# Patient Record
Sex: FEMALE | Race: WHITE | NOT HISPANIC OR LATINO | Employment: FULL TIME | URBAN - METROPOLITAN AREA
[De-identification: names, ages, dates, MRNs, and addresses within clinical notes are randomized per-mention and may not be internally consistent; named-entity substitution may affect disease eponyms.]

---

## 2018-05-14 ENCOUNTER — APPOINTMENT (OUTPATIENT)
Dept: RADIOLOGY | Facility: MEDICAL CENTER | Age: 38
End: 2018-05-14
Attending: EMERGENCY MEDICINE
Payer: COMMERCIAL

## 2018-05-14 ENCOUNTER — HOSPITAL ENCOUNTER (EMERGENCY)
Facility: MEDICAL CENTER | Age: 38
End: 2018-05-14
Attending: EMERGENCY MEDICINE
Payer: COMMERCIAL

## 2018-05-14 VITALS
SYSTOLIC BLOOD PRESSURE: 162 MMHG | DIASTOLIC BLOOD PRESSURE: 100 MMHG | HEIGHT: 64 IN | BODY MASS INDEX: 39.07 KG/M2 | OXYGEN SATURATION: 96 % | HEART RATE: 66 BPM | TEMPERATURE: 97.8 F | RESPIRATION RATE: 16 BRPM | WEIGHT: 228.84 LBS

## 2018-05-14 DIAGNOSIS — N10 PYELONEPHRITIS, ACUTE: ICD-10-CM

## 2018-05-14 LAB
ALBUMIN SERPL BCP-MCNC: 3.7 G/DL (ref 3.2–4.9)
ALBUMIN/GLOB SERPL: 1.2 G/DL
ALP SERPL-CCNC: 92 U/L (ref 30–99)
ALT SERPL-CCNC: 89 U/L (ref 2–50)
ANION GAP SERPL CALC-SCNC: 8 MMOL/L (ref 0–11.9)
APPEARANCE UR: ABNORMAL
AST SERPL-CCNC: 37 U/L (ref 12–45)
BACTERIA #/AREA URNS HPF: ABNORMAL /HPF
BASOPHILS # BLD AUTO: 0.9 % (ref 0–1.8)
BASOPHILS # BLD: 0.15 K/UL (ref 0–0.12)
BILIRUB SERPL-MCNC: 0.4 MG/DL (ref 0.1–1.5)
BILIRUB UR QL STRIP.AUTO: NEGATIVE
BUN SERPL-MCNC: 11 MG/DL (ref 8–22)
CALCIUM SERPL-MCNC: 9.1 MG/DL (ref 8.5–10.5)
CHLORIDE SERPL-SCNC: 102 MMOL/L (ref 96–112)
CO2 SERPL-SCNC: 27 MMOL/L (ref 20–33)
COLOR UR: YELLOW
CREAT SERPL-MCNC: 0.67 MG/DL (ref 0.5–1.4)
EOSINOPHIL # BLD AUTO: 0.44 K/UL (ref 0–0.51)
EOSINOPHIL NFR BLD: 2.6 % (ref 0–6.9)
EPI CELLS #/AREA URNS HPF: NEGATIVE /HPF
ERYTHROCYTE [DISTWIDTH] IN BLOOD BY AUTOMATED COUNT: 49.1 FL (ref 35.9–50)
GLOBULIN SER CALC-MCNC: 3.1 G/DL (ref 1.9–3.5)
GLUCOSE SERPL-MCNC: 109 MG/DL (ref 65–99)
GLUCOSE UR STRIP.AUTO-MCNC: NEGATIVE MG/DL
HCG SERPL QL: NEGATIVE
HCT VFR BLD AUTO: 42.3 % (ref 37–47)
HGB BLD-MCNC: 13.8 G/DL (ref 12–16)
HYALINE CASTS #/AREA URNS LPF: ABNORMAL /LPF
KETONES UR STRIP.AUTO-MCNC: NEGATIVE MG/DL
LEUKOCYTE ESTERASE UR QL STRIP.AUTO: ABNORMAL
LIPASE SERPL-CCNC: 18 U/L (ref 11–82)
LYMPHOCYTES # BLD AUTO: 6.24 K/UL (ref 1–4.8)
LYMPHOCYTES NFR BLD: 36.5 % (ref 22–41)
MANUAL DIFF BLD: NORMAL
MCH RBC QN AUTO: 29.9 PG (ref 27–33)
MCHC RBC AUTO-ENTMCNC: 32.6 G/DL (ref 33.6–35)
MCV RBC AUTO: 91.8 FL (ref 81.4–97.8)
MICRO URNS: ABNORMAL
MONOCYTES # BLD AUTO: 0.89 K/UL (ref 0–0.85)
MONOCYTES NFR BLD AUTO: 5.2 % (ref 0–13.4)
MORPHOLOGY BLD-IMP: NORMAL
NEUTROPHILS # BLD AUTO: 9.37 K/UL (ref 2–7.15)
NEUTROPHILS NFR BLD: 54.8 % (ref 44–72)
NITRITE UR QL STRIP.AUTO: NEGATIVE
NRBC # BLD AUTO: 0 K/UL
NRBC BLD-RTO: 0 /100 WBC
PH UR STRIP.AUTO: 8 [PH]
PLATELET # BLD AUTO: 333 K/UL (ref 164–446)
PLATELET BLD QL SMEAR: NORMAL
PMV BLD AUTO: 12 FL (ref 9–12.9)
POTASSIUM SERPL-SCNC: 3.5 MMOL/L (ref 3.6–5.5)
PROT SERPL-MCNC: 6.8 G/DL (ref 6–8.2)
PROT UR QL STRIP: 30 MG/DL
RBC # BLD AUTO: 4.61 M/UL (ref 4.2–5.4)
RBC # URNS HPF: ABNORMAL /HPF
RBC BLD AUTO: NORMAL
RBC UR QL AUTO: ABNORMAL
SODIUM SERPL-SCNC: 137 MMOL/L (ref 135–145)
SP GR UR STRIP.AUTO: 1.01
UROBILINOGEN UR STRIP.AUTO-MCNC: 0.2 MG/DL
WBC # BLD AUTO: 17.1 K/UL (ref 4.8–10.8)
WBC #/AREA URNS HPF: ABNORMAL /HPF

## 2018-05-14 PROCEDURE — 80053 COMPREHEN METABOLIC PANEL: CPT

## 2018-05-14 PROCEDURE — 81001 URINALYSIS AUTO W/SCOPE: CPT

## 2018-05-14 PROCEDURE — 83690 ASSAY OF LIPASE: CPT

## 2018-05-14 PROCEDURE — 700111 HCHG RX REV CODE 636 W/ 250 OVERRIDE (IP): Performed by: EMERGENCY MEDICINE

## 2018-05-14 PROCEDURE — 96375 TX/PRO/DX INJ NEW DRUG ADDON: CPT

## 2018-05-14 PROCEDURE — 85027 COMPLETE CBC AUTOMATED: CPT

## 2018-05-14 PROCEDURE — 87077 CULTURE AEROBIC IDENTIFY: CPT

## 2018-05-14 PROCEDURE — 74176 CT ABD & PELVIS W/O CONTRAST: CPT

## 2018-05-14 PROCEDURE — 84703 CHORIONIC GONADOTROPIN ASSAY: CPT

## 2018-05-14 PROCEDURE — 85007 BL SMEAR W/DIFF WBC COUNT: CPT

## 2018-05-14 PROCEDURE — 96374 THER/PROPH/DIAG INJ IV PUSH: CPT

## 2018-05-14 PROCEDURE — 99284 EMERGENCY DEPT VISIT MOD MDM: CPT

## 2018-05-14 PROCEDURE — 87186 SC STD MICRODIL/AGAR DIL: CPT

## 2018-05-14 PROCEDURE — 87086 URINE CULTURE/COLONY COUNT: CPT

## 2018-05-14 RX ORDER — FLUCONAZOLE 150 MG/1
150 TABLET ORAL DAILY
Qty: 2 TAB | Refills: 0 | Status: SHIPPED | OUTPATIENT
Start: 2018-05-14 | End: 2022-06-07

## 2018-05-14 RX ORDER — CEFTRIAXONE 1 G/1
1 INJECTION, POWDER, FOR SOLUTION INTRAMUSCULAR; INTRAVENOUS ONCE
Status: COMPLETED | OUTPATIENT
Start: 2018-05-14 | End: 2018-05-14

## 2018-05-14 RX ORDER — ONDANSETRON 8 MG/1
8 TABLET, ORALLY DISINTEGRATING ORAL EVERY 8 HOURS PRN
Qty: 15 TAB | Refills: 0 | Status: SHIPPED | OUTPATIENT
Start: 2018-05-14 | End: 2022-06-07

## 2018-05-14 RX ORDER — MORPHINE SULFATE 10 MG/ML
10 INJECTION, SOLUTION INTRAMUSCULAR; INTRAVENOUS ONCE
Status: COMPLETED | OUTPATIENT
Start: 2018-05-14 | End: 2018-05-14

## 2018-05-14 RX ORDER — ONDANSETRON 2 MG/ML
4 INJECTION INTRAMUSCULAR; INTRAVENOUS ONCE
Status: COMPLETED | OUTPATIENT
Start: 2018-05-14 | End: 2018-05-14

## 2018-05-14 RX ORDER — CEPHALEXIN 500 MG/1
500 CAPSULE ORAL 2 TIMES DAILY
Qty: 12 CAP | Refills: 0 | Status: SHIPPED | OUTPATIENT
Start: 2018-05-14 | End: 2018-05-20

## 2018-05-14 RX ORDER — NAPROXEN 500 MG/1
500 TABLET ORAL 2 TIMES DAILY WITH MEALS
Qty: 20 TAB | Refills: 0 | Status: SHIPPED | OUTPATIENT
Start: 2018-05-14 | End: 2022-06-07

## 2018-05-14 RX ADMIN — ONDANSETRON 4 MG: 2 INJECTION INTRAMUSCULAR; INTRAVENOUS at 03:49

## 2018-05-14 RX ADMIN — CEFTRIAXONE SODIUM 1 G: 1 INJECTION, POWDER, FOR SOLUTION INTRAMUSCULAR; INTRAVENOUS at 05:38

## 2018-05-14 RX ADMIN — MORPHINE SULFATE 10 MG: 10 INJECTION INTRAVENOUS at 03:49

## 2018-05-14 ASSESSMENT — PAIN SCALES - GENERAL: PAINLEVEL_OUTOF10: 8

## 2018-05-14 NOTE — ED NOTES
Patient given discharge teaching and education. Verbalizes understanding. Given chance to ask questions, all questions answered. Educated patient of signs and symptoms to returned to ER, and educated patient they can return for any concerning symptoms. Four prescriptions provided to patient. Education given to patient about medications. Patient states they have their belongings. Denies additional needs at this time. Reports pain is well controlled. Patient monitored every hour and PRN for safety and comfort. Patient ambulatory out of department.

## 2018-05-14 NOTE — ED PROVIDER NOTES
"ED Provider Note    ED Provider Note    Primary care provider: No primary care provider on file.  Means of arrival: Walk-in  History obtained from: Patient    CHIEF COMPLAINT  Chief Complaint   Patient presents with   • Flank Pain     R side. Hx of kidney stones but have always been on the left, today began on R side. Comes in waves, pt feels this is likely a kidney stone as well.    • Fever     99-100F at home.    • Nausea     Seen at 3:23 AM.   HPI  Brittney Mg is a 37 y.o. female with history of recurrent left-sided nephrolithiasis who presents to the Emergency Department with severe right flank pain radiating to the pelvis. She reports having left-sided flank pain approximately 5 days ago, this resolved, she fell that she passed a stone at that time. Since then she has had intermittent fevers for the past 3-4 days. She has not had a fever today. However today the right-sided abdominal and flank pain began.    She describes the pain as stabbing and radiating, worse with standing and moving and slightly alleviated with a recumbent position. She normally does not have pain in the right as all of the stones in the past and been in the left side. She has had lithotripsy in the past as well as stents, this has all been performed in [place]. She now a resident of the [facility] and has been residing here for the past 10 months.    She endorses fevers, none in the past 24 hours. She also notes nausea, vomiting, abdominal pain. She denies dysuria, hematuria, vaginal discharge, numbness, weakness, chest pain, shortness of breath, diarrhea.        REVIEW OF SYSTEMS  See HPI,   Remainder of ROS negative.     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    CURRENT MEDICATIONS  Reviewed.  See Encounter Summary.     ALLERGIES  Allergies   Allergen Reactions   • Iodine Swelling       PHYSICAL EXAM  VITAL SIGNS: BP (!) 162/100   Pulse 66   Temp 36.6 °C (97.8 °F)   Resp 16   Ht 1.626 m (5' 4\")   Wt 103.8 kg (228 lb 13.4 oz)   SpO2 96%   BMI 39.28 kg/m²   Constitutional: Awake, alert in no apparent distress. Appears uncomfortable, obese  HENT: Normocephalic, Bilateral external ears normal. Nose normal.   Eyes: Conjunctiva normal, non-icteric, EOMI.    Thorax & Lungs: Easy unlabored respirations, Clear to ascultation bilaterally.  Cardiovascular: Regular rate, Regular rhythm, No murmurs, rubs or gallops.  Abdomen:  Soft, moderate epigastric, right upper quadrant and right flank tenderness without rebound or guarding, nondistended, normal active bowel sounds.   :    Skin: Visualized skin is  Dry, No erythema, No rash.   Musculoskeletal:   No cyanosis, clubbing or edema.  Neurologic: Alert, Grossly non-focal.   Psychiatric: Normal affect, Normal mood  Lymphatic:  No cervical LAD        RADIOLOGY  CT-RENAL COLIC EVALUATION(A/P W/O)   Final Result         1. No definite calculus seen in the ureter. Bilateral pelvic calcifications, likely phleboliths. No hydronephrosis.      2. Multiple nonobstructive left intrarenal calculi. Duplicated left renal collecting system.      3. No evidence of inflammatory change in the abdomen or pelvis.  The study is however limited due to nonuse of intravenous contrast        The radiologist's interpretation of all radiological studies have been reviewed by me.    COURSE & MEDICAL DECISION MAKING  Pertinent Labs & Imaging studies reviewed. (See chart for details)    Differential diagnoses include but are not limited to: Cholecystitis, cholelithiasis, biliary colic, renal colic, pyelonephritis, pancreatitis, GERD, abdominal pain NOS    3:23 AM - Medical record " reviewed, patient seen and examined at bedside.    6:22 AM - for patient with negative results and urinalysis with leukocytosis. I reviewed images of the abdominal pelvis CT with the patient.    Decision Making:  This is a 37 y.o. year old female who presents with history of fevers and right-sided flank pain. Differentials listed above. The CT the abdomen and pelvis shows an unremarkable gallbladder, LFTs are normal as well, this time does not appear to be acute cholecystitis or acute cholangitis. The patient does have multiple intrarenal calcifications in the left renal pelvis, none on the right. There are also multiple calcifications in the pelvis. The ureters bilaterally are normal. There are no intraureteral stones. The patient does have a significant leukocytosis, she states that she frequently has an elevated white count that has not been explained in the past.  In any event, she does have a few bacteria and 20-50 white blood cells in the urine. Because of the pyuria, bacteriuria and leukocytosis I will treat the patient for pallor nephritis. She received 1 g Rocephin in the emergency department and will be treated with Keflex. She was instructed to return for any intractable pain, persistent vomiting or any other concern.    She was also given a prescription for Diflucan to treat any yeast infection that develops from the antibiotics.    The patient's blood pressure is elevated today. >120/80. I have referred them to primary care for follow up.       Discharge Medications:  New Prescriptions    CEPHALEXIN (KEFLEX) 500 MG CAP    Take 1 Cap by mouth 2 times a day for 6 days.    FLUCONAZOLE (DIFLUCAN) 150 MG TABLET    Take 1 Tab by mouth every day.    NAPROXEN (NAPROSYN) 500 MG TAB    Take 1 Tab by mouth 2 times a day, with meals.    ONDANSETRON (ZOFRAN ODT) 8 MG TABLET DISPERSIBLE    Take 1 Tab by mouth every 8 hours as needed for Nausea.       The patient will be discharged home in good condition.    FINAL  IMPRESSION  1. Pyelonephritis, acute

## 2018-05-14 NOTE — ED TRIAGE NOTES
"Chief Complaint   Patient presents with   • Flank Pain     R side. Hx of kidney stones but have always been on the left, today began on R side. Comes in waves, pt feels this is likely a kidney stone as well.    • Fever     99-100F at home.    • Nausea     BP (!) 162/100   Pulse 87   Temp 36.6 °C (97.8 °F)   Resp 16   Ht 1.626 m (5' 4\")   Wt 103.8 kg (228 lb 13.4 oz)   SpO2 100%   BMI 39.28 kg/m²     Pt ambulatory to triage for above. Reports frequency in urination, \"I'm going all the time\" and feeling of fullness. Pt returned to [facility], educated on triage process, instructed to notify staff of worsening symptoms/concerns. Given urine specimen cup.   "

## 2018-05-16 LAB
BACTERIA UR CULT: ABNORMAL
BACTERIA UR CULT: ABNORMAL
SIGNIFICANT IND 70042: ABNORMAL
SITE SITE: ABNORMAL
SOURCE SOURCE: ABNORMAL

## 2018-05-16 NOTE — ED NOTES
ED Positive Culture Follow-up/Notification Note:    Date: 5/16/18     Patient seen in the ED on 5/14/2018 for right sided flank pain.   1. Pyelonephritis, acute       Discharge Medication List as of 5/14/2018  6:16 AM      START taking these medications    Details   naproxen (NAPROSYN) 500 MG Tab Take 1 Tab by mouth 2 times a day, with meals., Disp-20 Tab, R-0, Print Rx Paper      cephALEXin (KEFLEX) 500 MG Cap Take 1 Cap by mouth 2 times a day for 6 days., Disp-12 Cap, R-0, Print Rx Paper      ondansetron (ZOFRAN ODT) 8 MG TABLET DISPERSIBLE Take 1 Tab by mouth every 8 hours as needed for Nausea., Disp-15 Tab, R-0, Print Rx Paper             Allergies: Iodine     Vitals:    05/14/18 0400 05/14/18 0430 05/14/18 0500 05/14/18 0600   BP:       Pulse: 75 67 72 66   Resp:       Temp:       SpO2: 91% 96% 96% 96%   Weight:       Height:           Final cultures:   Results     Procedure Component Value Units Date/Time    URINE CULTURE(NEW) [[id number]]  (Abnormal)  (Susceptibility) Collected:  05/14/18 0313    Order Status:  Completed Specimen:  Urine Updated:  05/16/18 1109     Significant Indicator POS (POS)     Source UR     Site --     Urine Culture -- (A)      Escherichia coli  ,000 cfu/mL   (A)    Culture & Susceptibility     ESCHERICHIA COLI     Antibiotic Sensitivity Microscan Unit Status    Ampicillin Sensitive <=8 mcg/mL Final    Method: SENSITIVITY, ELISHA    Cefepime Sensitive <=8 mcg/mL Final    Method: SENSITIVITY, ELISHA    Cefotaxime Sensitive <=2 mcg/mL Final    Method: SENSITIVITY, ELISHA    Cefotetan Sensitive <=16 mcg/mL Final    Method: SENSITIVITY, ELISHA    Ceftazidime Sensitive <=1 mcg/mL Final    Method: SENSITIVITY, ELISHA    Ceftriaxone Sensitive <=8 mcg/mL Final    Method: SENSITIVITY, ELISHA    Cefuroxime Sensitive <=4 mcg/mL Final    Method: SENSITIVITY, ELISHA    Cephalothin Sensitive <=8 mcg/mL Final    Method: SENSITIVITY, ELISHA    Ciprofloxacin Sensitive <=1 mcg/mL Final    Method: SENSITIVITY, ELISHA     Gentamicin Sensitive <=4 mcg/mL Final    Method: SENSITIVITY, ELISHA    Levofloxacin Sensitive <=2 mcg/mL Final    Method: SENSITIVITY, ELISHA    Nitrofurantoin Sensitive <=32 mcg/mL Final    Method: SENSITIVITY, ELISHA    Pip/Tazobactam Sensitive <=16 mcg/mL Final    Method: SENSITIVITY, ELISHA    Piperacillin Sensitive <=16 mcg/mL Final    Method: SENSITIVITY, ELISHA    Tigecycline Sensitive <=2 mcg/mL Final    Method: SENSITIVITY, ELISHA    Tobramycin Sensitive <=4 mcg/mL Final    Method: SENSITIVITY, ELISHA    Trimeth/Sulfa Sensitive <=2/38 mcg/mL Final    Method: SENSITIVITY, ELISHA                       URINALYSIS CULTURE, IF INDICATED [[id number]]  (Abnormal) Collected:  05/14/18 0313    Order Status:  Completed Specimen:  Urine Updated:  05/14/18 0334     Color Yellow     Character Cloudy (A)     Specific Gravity 1.012     Ph 8.0     Glucose Negative mg/dL      Ketones Negative mg/dL      Protein 30 (A) mg/dL      Bilirubin Negative     Urobilinogen, Urine 0.2     Nitrite Negative     Leukocyte Esterase Moderate (A)     Occult Blood Small (A)     Micro Urine Req Microscopic          Plan:   Patient received ceftriaxone 1g IV in the ED.  Appropriate antibiotic therapy prescribed. No changes required based upon culture result.  Discussed results with patient - she states she is feeling much improved since presentation.  Recommended to continue antibiotics until course is completed, patient verbalized understanding.    Becky Byrnes

## 2018-07-06 ENCOUNTER — GYNECOLOGY VISIT (OUTPATIENT)
Dept: OBGYN | Facility: CLINIC | Age: 38
End: 2018-07-06
Payer: COMMERCIAL

## 2018-07-06 VITALS
HEIGHT: 64 IN | SYSTOLIC BLOOD PRESSURE: 160 MMHG | BODY MASS INDEX: 39.44 KG/M2 | WEIGHT: 231 LBS | DIASTOLIC BLOOD PRESSURE: 98 MMHG

## 2018-07-06 DIAGNOSIS — Z11.51 SCREENING FOR HUMAN PAPILLOMAVIRUS (HPV): ICD-10-CM

## 2018-07-06 DIAGNOSIS — Z01.419 WELL WOMAN EXAM WITH ROUTINE GYNECOLOGICAL EXAM: ICD-10-CM

## 2018-07-06 DIAGNOSIS — N92.6 IRREGULAR MENSTRUATION: ICD-10-CM

## 2018-07-06 DIAGNOSIS — N90.89 VULVAR LESION: ICD-10-CM

## 2018-07-06 DIAGNOSIS — I10 ESSENTIAL HYPERTENSION: ICD-10-CM

## 2018-07-06 DIAGNOSIS — Z12.4 PAP SMEAR FOR CERVICAL CANCER SCREENING: ICD-10-CM

## 2018-07-06 PROBLEM — N20.0 KIDNEY STONES: Status: ACTIVE | Noted: 2018-07-06

## 2018-07-06 PROCEDURE — 99385 PREV VISIT NEW AGE 18-39: CPT | Performed by: OBSTETRICS & GYNECOLOGY

## 2018-07-06 NOTE — PROGRESS NOTES
Brittney Mg is a 38 y.o.  female who presents for her Annual Gynecologic Exam      HPI Comments: Pt presents for her annual well woman exam.   Pt has complaint of vaginal dryness before period, decreased sex drive, irregular periods.    Has not taken BP meds in a couple days  Patient's last menstrual period was 2018.    Review of Systems:   Pertinent positives documented in HPI and all other systems reviewed & are negative    PGYN Hx: remote hx abnl pap, last pap normal, no hx STDs  Menses irregular, occur every 2-5 weeks, last 2-4 days, dark blood with quarter sized clots, states flow is medium, never soaks a pad or tampon  Hx breast lumps, normal mammograms    All PMH, PSH, allergies, social history and FH reviewed and updated today:  Past Medical History:   Diagnosis Date   • Hypertension    • Kidney disease    • Migraine    • Thyroid disease        Past Surgical History:   Procedure Laterality Date   • LITHOTRIPSY     • SPLENECTOMY     • THYROID LOBECTOMY         Medications:   Current Outpatient Prescriptions Ordered in [facility]   Medication Sig Dispense Refill   • naproxen (NAPROSYN) 500 MG Tab Take 1 Tab by mouth 2 times a day, with meals. (Patient not taking: Reported on 2018) 20 Tab 0   • ondansetron (ZOFRAN ODT) 8 MG TABLET DISPERSIBLE Take 1 Tab by mouth every 8 hours as needed for Nausea. (Patient not taking: Reported on 2018) 15 Tab 0   • fluconazole (DIFLUCAN) 150 MG tablet Take 1 Tab by mouth every day. (Patient not taking: Reported on 2018) 2 Tab 0     No current Epic-ordered facility-administered medications on file.        Iodine    Social History     Social History   • Marital status:      Spouse name: N/A   • Number of children: N/A   • Years of education: N/A     Social History Main Topics   • Smoking status: Current Every Day Smoker   • Smokeless tobacco: Never Used   • Alcohol use No   • Drug use: No   • Sexual activity: Yes     Partners: Male     Birth control/  "protection: Surgical     Other Topics Concern   • Not on file     Social History Narrative   • No narrative on file       Family History   Problem Relation Age of Onset   • Heart Disease Mother    • Heart Disease Father    • Hypertension Father           Objective:   Vital measurements:  Blood pressure 160/98, height 1.626 m (5' 4\"), weight 104.8 kg (231 lb), last menstrual period 05/25/2018, not currently breastfeeding.  Body mass index is 39.65 kg/m². (Goal BM I>18 <25)    Physical Exam   Nursing note and vitals reviewed.  Constitutional: She is oriented to person, place, and time. She appears well-developed and well-nourished. No distress.     HEENT:   Head: Normocephalic and atraumatic.   Right Ear: External ear normal.   Left Ear: External ear normal.   Nose: Nose normal.   Eyes: Conjunctivae and EOM are normal. Pupils are equal, round, and reactive to light. No scleral icterus.     Neck: Normal range of motion. Neck supple. No tracheal deviation present. No thyromegaly present. No cervical or supraclavicular lymphadenopathy.    Pulmonary/Chest: Effort normal and breath sounds normal. No respiratory distress. She has no wheezes. She has no rales. She exhibits no tenderness.     Cardiovascular: Regular, rate and rhythm. No edema.    Breast: Symmetrical, normal consistency without masses., No dimpling or skin changes, Normal nipples without discharge, no axillary lymphadenopathy    Abdominal: Soft. Bowel sounds are normal. She exhibits no distension and no mass. No tenderness. She has no rebound and no guarding.     Genitourinary: Obesity limits patient's accuracy  Pelvic exam was performed with patient supine.  External genitalia with no abnormal pigmentation, labial fusion, rashes, tenderness, or injury to the labia bilaterally. 4mm white velvety raised lesion on right labia majora.  BUS normal  Vagina is pink and moist with no lesions, foul discharge, erythema, tenderness or bleeding. No foreign body around " the vagina or signs of injury.   Cervix exhibits no motion tenderness, no discharge and no friability, no lesions.   Uterus is not deviated, not enlarged, not fixed and not tender.  Right adnexa displays no mass, no tenderness and no fullness.  Left adnexa displays no mass, no tenderness and no fullness.     Musculoskeletal: Normal range of motion. Non tender. She exhibits no edema and no tenderness.     Lymphadenopathy: She has no cervical or supraclavicular adenopathy.     Neurological: She is alert and oriented to person, place, and time. She exhibits normal muscle tone.     Skin: Skin is warm and dry. No rash noted. She is not diaphoretic. No erythema. No pallor.     Psychiatric: She has a normal mood and affect. Her behavior is normal. Judgment and thought content normal.        Assessment:     1. Well woman exam with routine gynecological exam     2. Screening for human papillomavirus (HPV)     3. Pap smear for cervical cancer screening     4. Irregular menstruation  US-GYN-PELVIS TRANSVAGINAL   5. Vulvar lesion  REFERRAL TO GYNECOLOGY   6. Essential hypertension           Plan:   Pap and physical exam performed  Self breast awareness discussed.  Encouraged exercise and proper diet.  Mammograms starting @ age 40 annually.  See medications and orders placed in encounter report.  Ordered pelvic u/s to evaluate abnl bleeding  Referral placed for excision of vulvar lesion  Follow up with PCP for HTN  Follow up in 1 year for annual exam or sooner as needed

## 2018-07-07 ENCOUNTER — HOSPITAL ENCOUNTER (OUTPATIENT)
Facility: MEDICAL CENTER | Age: 38
End: 2018-07-07
Attending: OBSTETRICS & GYNECOLOGY
Payer: COMMERCIAL

## 2018-07-12 LAB
C TRACH DNA SPEC QL NAA+PROBE: NOT DETECTED
N GONORRHOEA DNA SPEC QL NAA+PROBE: NOT DETECTED
PHYSICIAN READ PAP  881411: NORMAL

## 2018-07-17 ENCOUNTER — HOSPITAL ENCOUNTER (OUTPATIENT)
Dept: RADIOLOGY | Facility: MEDICAL CENTER | Age: 38
End: 2018-07-17
Attending: OBSTETRICS & GYNECOLOGY
Payer: COMMERCIAL

## 2018-07-17 DIAGNOSIS — N92.6 IRREGULAR MENSTRUATION: ICD-10-CM

## 2018-07-17 PROCEDURE — 76830 TRANSVAGINAL US NON-OB: CPT

## 2018-07-20 ENCOUNTER — TELEPHONE (OUTPATIENT)
Dept: OBGYN | Facility: CLINIC | Age: 38
End: 2018-07-20

## 2018-07-20 NOTE — TELEPHONE ENCOUNTER
----- Message from Korin Dow M.D. sent at 7/19/2018  4:07 PM PDT -----  Please inform patient her ultrasound looked good. Her uterus was mildly enlarged (10cm and 9cm or less is normal). This is not a problem unless she has heavy periods. She has 2cm fibroid on the back of her uterus also. These are benign tumors of the uterus and do not need to be removed or treated unless she has heavy periods. Her ovaries looked normal. Overall the ultrasound findings were pretty normal.

## 2018-07-20 NOTE — TELEPHONE ENCOUNTER
Pt advised of ultrasound results and she again states that her menses is only heavy on 1st day.Pt is also scheduled for vulvar BX and she will discuss any further questions with Dr at time of visit. She states she and  would like to have her BTL reversed and she is advised that our doctors do not preform this procedure but she is given names of  and .[facility], [place]

## 2022-06-07 ENCOUNTER — APPOINTMENT (OUTPATIENT)
Dept: RADIOLOGY | Facility: IMAGING CENTER | Age: 42
End: 2022-06-07
Attending: NURSE PRACTITIONER
Payer: COMMERCIAL

## 2022-06-07 ENCOUNTER — OFFICE VISIT (OUTPATIENT)
Dept: URGENT CARE | Facility: CLINIC | Age: 42
End: 2022-06-07
Payer: COMMERCIAL

## 2022-06-07 VITALS
HEIGHT: 64 IN | BODY MASS INDEX: 38.21 KG/M2 | RESPIRATION RATE: 14 BRPM | SYSTOLIC BLOOD PRESSURE: 140 MMHG | DIASTOLIC BLOOD PRESSURE: 102 MMHG | HEART RATE: 84 BPM | OXYGEN SATURATION: 95 % | TEMPERATURE: 97.5 F | WEIGHT: 223.8 LBS

## 2022-06-07 DIAGNOSIS — M25.571 ACUTE RIGHT ANKLE PAIN: ICD-10-CM

## 2022-06-07 DIAGNOSIS — M25.471 EFFUSION OF RIGHT ANKLE: ICD-10-CM

## 2022-06-07 DIAGNOSIS — I10 HYPERTENSION, UNSPECIFIED TYPE: ICD-10-CM

## 2022-06-07 PROCEDURE — 73610 X-RAY EXAM OF ANKLE: CPT | Mod: TC,RT | Performed by: NURSE PRACTITIONER

## 2022-06-07 PROCEDURE — 99214 OFFICE O/P EST MOD 30 MIN: CPT | Performed by: NURSE PRACTITIONER

## 2022-06-07 RX ORDER — LISINOPRIL 5 MG/1
5 TABLET ORAL DAILY
COMMUNITY

## 2022-06-07 ASSESSMENT — ENCOUNTER SYMPTOMS
NUMBNESS: 0
LOSS OF SENSATION: 0
MYALGIAS: 0
MUSCLE WEAKNESS: 0
FEVER: 0
DIZZINESS: 0
SHORTNESS OF BREATH: 0
NAUSEA: 0
CHILLS: 0
INABILITY TO BEAR WEIGHT: 0
TINGLING: 0
VOMITING: 0
SORE THROAT: 0
EYE PAIN: 0

## 2022-06-07 NOTE — PROGRESS NOTES
Subjective:   Brittney Mg is a 41 y.o. female who presents for Ankle Pain ((R) pt states coming off ladder, felt a strain and now hard to walk and hard to bear weight. Ankle swells up. Pain makes her nauseous. X 2 weeks. Need note for work. )      Ankle Pain   The incident occurred more than 1 week ago. The incident occurred at home. The injury mechanism was an inversion injury. The pain is present in the right ankle. The pain is at a severity of 10/10. The pain is severe. The pain has been worsening since onset. Pertinent negatives include no inability to bear weight, loss of sensation, muscle weakness, numbness or tingling. She reports no foreign bodies present. The symptoms are aggravated by movement, palpation and weight bearing. She has tried acetaminophen, ice, rest, non-weight bearing and immobilization for the symptoms. The treatment provided no relief.       Review of Systems   Constitutional: Negative for chills and fever.   HENT: Negative for sore throat.    Eyes: Negative for pain.   Respiratory: Negative for shortness of breath.    Cardiovascular: Negative for chest pain.   Gastrointestinal: Negative for nausea and vomiting.   Genitourinary: Negative for hematuria.   Musculoskeletal: Positive for joint pain. Negative for myalgias.   Skin: Negative for rash.   Neurological: Negative for dizziness, tingling and numbness.       Medications:    • lisinopril Tabs    Allergies: Iodine    Problem List: Brittney Mg does not have any pertinent problems on file.    Surgical History:  Past Surgical History:   Procedure Laterality Date   • LITHOTRIPSY     • SPLENECTOMY     • THYROID LOBECTOMY         Past Social Hx: Brittney Mg  reports that she has been smoking. She has never used smokeless tobacco. She reports that she does not drink alcohol and does not use drugs.     Past Family Hx:  Brittney Mg family history includes Heart Disease in her father and mother; Hypertension  "in her father.     Problem list, medications, and allergies reviewed by myself today in Epic.     Objective:     BP (!) 140/102   Pulse 84   Temp 36.4 °C (97.5 °F)   Resp 14   Ht 1.626 m (5' 4\")   Wt 102 kg (223 lb 12.8 oz)   SpO2 95%   BMI 38.42 kg/m²     Physical Exam  Constitutional:       Appearance: Normal appearance. She is not ill-appearing or toxic-appearing.   HENT:      Head: Normocephalic.      Right Ear: External ear normal.      Left Ear: External ear normal.      Nose: Nose normal.      Mouth/Throat:      Lips: Pink.      Mouth: Mucous membranes are moist.   Eyes:      General: Lids are normal.         Right eye: No discharge.         Left eye: No discharge.   Pulmonary:      Effort: Pulmonary effort is normal. No accessory muscle usage or respiratory distress.   Musculoskeletal:      Cervical back: Full passive range of motion without pain.      Right ankle: Swelling present. Tenderness present over the lateral malleolus. No medial malleolus or base of 5th metatarsal tenderness. Decreased range of motion.      Right Achilles Tendon: Normal.      Right foot: Normal.   Skin:     Coloration: Skin is not pale.   Neurological:      Mental Status: She is alert and oriented to person, place, and time.   Psychiatric:         Mood and Affect: Mood normal.         Thought Content: Thought content normal.         Assessment/Plan:     Diagnosis and associated orders:     1. Acute right ankle pain  DX-ANKLE 3+ VIEWS RIGHT    Referral to Sports Medicine   2. Effusion of right ankle  Referral to Sports Medicine   3. Hypertension, unspecified type          Comments/MDM:     I independently reviewed the patient's imaging and agree with the interpretation of the radiologist.        1.  No acute fracture identified.     • 2.  Ankle effusion      I personally reviewed prior external notes and prior test results pertinent to today's visit.   Discussed management options, risks and benefits, and alternatives to " treatment plan agreed upon.  Patient was placed in a tall walking cam boot and provided crutches we will follow-up with sports medicine.  Red flags discussed and indications to immediately call 911 or present to the Emergency Department.   Supportive care, differential diagnoses, and indications for immediate follow-up discussed with patient.    Patient expresses understanding and agrees to plan. Patient denies any other questions or concerns.     • Your blood pressure is elevated here in Urgent Care. Please monitor your blood pressure over the next several days. If your blood pressure continues to be 120/80 or higher please contact your physician for blood pressure management.               My total time spent caring for the patient on the day of the encounter was 30 minutes.   This does not include time spent on separately billable procedures/tests.      Please note that this dictation was created using voice recognition software. I have made a reasonable attempt to correct obvious errors, but I expect that there are errors of grammar and possibly content that I did not discover before finalizing the note.    This note was electronically signed by Kris VELEZ.

## 2022-06-07 NOTE — LETTER
June 7, 2022         Patient: Brittney Mg   YOB: 1980   Date of Visit: 6/7/2022           To Whom it May Concern:    Brittney Mg was seen in my clinic on 6/7/2022. She may return to work on 6/11/22    If you have any questions or concerns, please don't hesitate to call.        Sincerely,           TEOFILO Granados.  Electronically Signed

## 2023-06-19 ENCOUNTER — HOSPITAL ENCOUNTER (OUTPATIENT)
Dept: RADIOLOGY | Facility: MEDICAL CENTER | Age: 43
End: 2023-06-19
Attending: INTERNAL MEDICINE
Payer: COMMERCIAL

## 2023-06-19 DIAGNOSIS — R09.89 ABNORMAL CHEST SOUNDS: ICD-10-CM

## 2023-06-19 DIAGNOSIS — E89.0 POST-OPERATIVE HYPOTHYROIDISM: ICD-10-CM

## 2023-06-19 PROCEDURE — 76536 US EXAM OF HEAD AND NECK: CPT

## 2023-07-11 ENCOUNTER — HOSPITAL ENCOUNTER (OUTPATIENT)
Dept: RADIOLOGY | Facility: MEDICAL CENTER | Age: 43
End: 2023-07-11
Attending: INTERNAL MEDICINE
Payer: COMMERCIAL

## 2023-07-11 DIAGNOSIS — E04.1 THYROID NODULE: ICD-10-CM

## 2023-07-11 PROCEDURE — 88173 CYTOPATH EVAL FNA REPORT: CPT

## 2023-07-11 PROCEDURE — 10005 FNA BX W/US GDN 1ST LES: CPT

## 2023-07-11 NOTE — PROGRESS NOTES
US guided R mid thyroid nodule fine needle aspiration done by Dr. Hampton; NON-SEDATION (no H&P required as this is a NON SEDATION procedure) R anterior aspect of neck access site, dressing CDI; x1 jar of cytolyt obtained, x1 vial afirma obtained and hand delivered to pathology lab. Pt tolerated the procedure well. Pt hemodynamically stable pre/intra/post procedure; all questions and concerns answered prior to being d/c; patient provided with appropriate education for procedure; pt d/c home.

## 2023-07-12 LAB — PATHOLOGY CONSULT NOTE: NORMAL
